# Patient Record
(demographics unavailable — no encounter records)

---

## 2025-04-23 NOTE — CONSULT LETTER
[Dear  ___] : Dear  [unfilled], [Consult Letter:] : I had the pleasure of evaluating your patient, [unfilled]. [Please see my note below.] : Please see my note below. [Consult Closing:] : Thank you very much for allowing me to participate in the care of this patient.  If you have any questions, please do not hesitate to contact me. [Sincerely,] : Sincerely, [FreeTextEntry2] : BETSY TIAN [FreeTextEntry3] : Xander Catherine MD

## 2025-04-23 NOTE — HISTORY OF PRESENT ILLNESS
[Headaches] : headaches [Visual Symptoms] : no ~T visual symptoms [Polyuria] : no polyuria [Polydipsia] : no polydipsia [FreeTextEntry2] : Mimi is an 8 yr 8 month girl with Graves disease. She was followed with Dr. Moore for hyperthyroidism since April 2018. At diagnosis, her TSH was <0.005, T3 >6.51, T4 >24.86, free T4 >7.77, TSH Ab 20.6 (H), TSI 10.9 (H). She was on Methimazole 5 mg tabs: 1/2 in AM , 1/2 in afternoon, and 1 pill in PM. Her Methimazole was discontinued in April because of hypothyroidism and she was restarted on Methimazole once she was again hyperthyroid. Her initial visit here was on June 2019. In mid June 2020, she developed swelling of the face with rash on face, chest and arm associated with itchiness. It resolved with Zyrtec treatment. On Oct 2020 she was hypothyroid, and her Methimazole dose was reduced to 7.5 mg daily and in Nov 2020 her TFTs were normal. At her visit in August 2021 she had a rash on her trunk, anti histamine treatment was recommended. She also had a goiter that was diffusely enlarged: 5.5 cm lobes, 2 cm isthmus. Blood work was normal. At her visit in Dec 2021, thyroid was smaller (4 cm lobes, 1.5 cm isthmus). A nodule was palpable on left side. On 12/8/2021 thyroid US showed diffusely heterogeneous and hyperemic thyroid gland compatible with history of Graves' disease, with a 0.7 x 0.6 x 0.7 cm indeterminate nodule in the left thyroid lobe. Repeated ultrasound was done in May 2022 and showed stable nodule on left. She had bilateral cervical adenopathy with normal morphology. In July 2022 she again developed a rash on her face and extremities. She was seen by a dermatologist and was prescribed Benadryl and Xyzal. It settled down In August 2022, her TFTs were normal. Screening growth labs were sent since her height percentiles gradually decreased since 3 years of age. Results were normal except for an ESR of 29 mm/hr. In Nov 2022, her growth rate was 6.46 cm/yr. TFTs were fine 3 weeks prior to the visit. Ultrasound from Nov 2022 was stable (no change in subcentimeter nodule since Dec 2021 i.e 11 months). In March 2023, her growth rate was 4.91 cm/year. Her thyroid function was normal. I was concerned about her steadily declining height percentile and therefore ordered a growth hormone stimulation test. This was done in June 2023 and she had a normal response. In July 2023, she had a mild punctate rash that was slightly itchy. Mother told me that this occurs every summer and usually responds to Benadryl. Her thyroid function was normal and a comprehensive metabolic panel normal. In addition, she had a normal CRP and celiac screen. Her bone age was 5 years and 9 months at chronological age 6 years and 11 months (i.e. just over 1 year delayed). She was last seen on 10/1/2024 and blood work was obtained. Results showed: TSH 1.84 uIU/mL, free T4 0.8 ng/dL, total T4 6.1 ug/dL, total T3 166 ng/dL, and CMP normal.  Both TSH receptor antibodies and thyroid stimulating immunoglobulin were elevated, 8.35 IU/L and 0.68 IU/L, respectively in May 2024  Last thyroid ultrasound on 6/6/24 showed unchanged heterogenous appearance of diffusely enlarged thyroid gland consistent with Graves' disease, left thyroid 6 mm nodule unchanged in size, and prominent bilateral cervical lymph nodes. She has been well since her last visit.  She has occasional headaches. 3rd grade. Father 71   Mother 64 inches

## 2025-04-23 NOTE — PHYSICAL EXAM
[Healthy Appearing] : healthy appearing [Well formed] : well formed [WNL for age] : within normal limits of age [Goiter] : goiter [Soft] : was soft [Enlarged Diffusely] : was diffusely enlarged [Thyroid Multiple Nodules Left] :  multiple ~M nodules palpated on the left lobe of the thyroid [Normal S1 and S2] : normal S1 and S2 [Clear to Ausculation Bilaterally] : clear to auscultation bilaterally [Abdomen Soft] : soft [1] : was Quentin stage 1 [Normal Appearance] : normal in appearance [Quentin Stage ___] : the Quentin stage for breast development was [unfilled] [Normal] : grossly intact [de-identified] : Lobes 4 cm on the right and 4 cm on the left, 2 cm isthmus

## 2025-04-23 NOTE — PHYSICAL EXAM
[Healthy Appearing] : healthy appearing [Well formed] : well formed [WNL for age] : within normal limits of age [Goiter] : goiter [Enlarged Diffusely] : was diffusely enlarged [Soft] : was soft [Thyroid Multiple Nodules Left] :  multiple ~M nodules palpated on the left lobe of the thyroid [Normal S1 and S2] : normal S1 and S2 [Clear to Ausculation Bilaterally] : clear to auscultation bilaterally [Abdomen Soft] : soft [1] : was Quentin stage 1 [Normal Appearance] : normal in appearance [Quentin Stage ___] : the Quentin stage for breast development was [unfilled] [Normal] : grossly intact [de-identified] : Lobes 4 cm on the right and 4 cm on the left, 2 cm isthmus

## 2025-04-23 NOTE — PHYSICAL EXAM
[Healthy Appearing] : healthy appearing [Well formed] : well formed [WNL for age] : within normal limits of age [Goiter] : goiter [Enlarged Diffusely] : was diffusely enlarged [Soft] : was soft [Thyroid Multiple Nodules Left] :  multiple ~M nodules palpated on the left lobe of the thyroid [Normal S1 and S2] : normal S1 and S2 [Clear to Ausculation Bilaterally] : clear to auscultation bilaterally [Abdomen Soft] : soft [Normal Appearance] : normal in appearance [1] : was Quentin stage 1 [Quentin Stage ___] : the Quentin stage for breast development was [unfilled] [Normal] : grossly intact [de-identified] : Lobes 4 cm on the right and 4 cm on the left, 2 cm isthmus